# Patient Record
Sex: MALE | Race: WHITE | NOT HISPANIC OR LATINO | Employment: FULL TIME | URBAN - METROPOLITAN AREA
[De-identification: names, ages, dates, MRNs, and addresses within clinical notes are randomized per-mention and may not be internally consistent; named-entity substitution may affect disease eponyms.]

---
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
## 2025-04-08 ENCOUNTER — OFFICE VISIT (OUTPATIENT)
Dept: FAMILY MEDICINE | Facility: CLINIC | Age: 20
End: 2025-04-08
Payer: COMMERCIAL

## 2025-04-08 VITALS
RESPIRATION RATE: 17 BRPM | HEART RATE: 69 BPM | HEIGHT: 68 IN | BODY MASS INDEX: 18.04 KG/M2 | WEIGHT: 119 LBS | TEMPERATURE: 98.4 F | DIASTOLIC BLOOD PRESSURE: 66 MMHG | OXYGEN SATURATION: 99 % | SYSTOLIC BLOOD PRESSURE: 127 MMHG

## 2025-04-08 DIAGNOSIS — N50.811 PAIN IN BOTH TESTICLES: ICD-10-CM

## 2025-04-08 DIAGNOSIS — R10.84 ABDOMINAL PAIN, GENERALIZED: Primary | ICD-10-CM

## 2025-04-08 DIAGNOSIS — N50.812 PAIN IN BOTH TESTICLES: ICD-10-CM

## 2025-04-08 LAB
ALBUMIN SERPL BCG-MCNC: 5.2 G/DL (ref 3.5–5.2)
ALBUMIN UR-MCNC: NEGATIVE MG/DL
ALP SERPL-CCNC: 96 U/L (ref 40–150)
ALT SERPL W P-5'-P-CCNC: 11 U/L (ref 0–70)
ANION GAP SERPL CALCULATED.3IONS-SCNC: 12 MMOL/L (ref 7–15)
APPEARANCE UR: CLEAR
AST SERPL W P-5'-P-CCNC: 21 U/L (ref 0–45)
BACTERIA #/AREA URNS HPF: ABNORMAL /HPF
BILIRUB SERPL-MCNC: 0.7 MG/DL
BILIRUB UR QL STRIP: NEGATIVE
BUN SERPL-MCNC: 10.5 MG/DL (ref 6–20)
CALCIUM SERPL-MCNC: 9.8 MG/DL (ref 8.8–10.4)
CHLORIDE SERPL-SCNC: 105 MMOL/L (ref 98–107)
COLOR UR AUTO: YELLOW
CREAT SERPL-MCNC: 0.83 MG/DL (ref 0.67–1.17)
CRP SERPL-MCNC: <3 MG/L
EGFRCR SERPLBLD CKD-EPI 2021: >90 ML/MIN/1.73M2
ERYTHROCYTE [DISTWIDTH] IN BLOOD BY AUTOMATED COUNT: 11.4 % (ref 10–15)
GLUCOSE SERPL-MCNC: 92 MG/DL (ref 70–99)
GLUCOSE UR STRIP-MCNC: NEGATIVE MG/DL
HCO3 SERPL-SCNC: 25 MMOL/L (ref 22–29)
HCT VFR BLD AUTO: 45.9 % (ref 40–53)
HGB BLD-MCNC: 16.2 G/DL (ref 13.3–17.7)
HGB UR QL STRIP: ABNORMAL
HIV 1+2 AB+HIV1 P24 AG SERPL QL IA: NONREACTIVE
KETONES UR STRIP-MCNC: NEGATIVE MG/DL
LEUKOCYTE ESTERASE UR QL STRIP: NEGATIVE
MCH RBC QN AUTO: 32.7 PG (ref 26.5–33)
MCHC RBC AUTO-ENTMCNC: 35.3 G/DL (ref 31.5–36.5)
MCV RBC AUTO: 93 FL (ref 78–100)
NITRATE UR QL: NEGATIVE
PH UR STRIP: 7.5 [PH] (ref 5–7)
PLATELET # BLD AUTO: 247 10E3/UL (ref 150–450)
POTASSIUM SERPL-SCNC: 4.3 MMOL/L (ref 3.4–5.3)
PROT SERPL-MCNC: 7.3 G/DL (ref 6.4–8.3)
RBC # BLD AUTO: 4.95 10E6/UL (ref 4.4–5.9)
RBC #/AREA URNS AUTO: ABNORMAL /HPF
SODIUM SERPL-SCNC: 142 MMOL/L (ref 135–145)
SP GR UR STRIP: 1.01 (ref 1–1.03)
SQUAMOUS #/AREA URNS AUTO: ABNORMAL /LPF
UROBILINOGEN UR STRIP-ACNC: 0.2 E.U./DL
WBC # BLD AUTO: 6.4 10E3/UL (ref 4–11)
WBC #/AREA URNS AUTO: ABNORMAL /HPF

## 2025-04-08 PROCEDURE — 3078F DIAST BP <80 MM HG: CPT

## 2025-04-08 PROCEDURE — 87389 HIV-1 AG W/HIV-1&-2 AB AG IA: CPT

## 2025-04-08 PROCEDURE — 99214 OFFICE O/P EST MOD 30 MIN: CPT

## 2025-04-08 PROCEDURE — 81001 URINALYSIS AUTO W/SCOPE: CPT

## 2025-04-08 PROCEDURE — 3074F SYST BP LT 130 MM HG: CPT

## 2025-04-08 PROCEDURE — 80053 COMPREHEN METABOLIC PANEL: CPT

## 2025-04-08 PROCEDURE — 86780 TREPONEMA PALLIDUM: CPT

## 2025-04-08 PROCEDURE — 86140 C-REACTIVE PROTEIN: CPT

## 2025-04-08 PROCEDURE — 36415 COLL VENOUS BLD VENIPUNCTURE: CPT

## 2025-04-08 PROCEDURE — 85027 COMPLETE CBC AUTOMATED: CPT

## 2025-04-08 ASSESSMENT — PATIENT HEALTH QUESTIONNAIRE - PHQ9
10. IF YOU CHECKED OFF ANY PROBLEMS, HOW DIFFICULT HAVE THESE PROBLEMS MADE IT FOR YOU TO DO YOUR WORK, TAKE CARE OF THINGS AT HOME, OR GET ALONG WITH OTHER PEOPLE: SOMEWHAT DIFFICULT
SUM OF ALL RESPONSES TO PHQ QUESTIONS 1-9: 7
SUM OF ALL RESPONSES TO PHQ QUESTIONS 1-9: 7
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
## 2025-04-09 LAB — T PALLIDUM AB SER QL: NONREACTIVE

## 2025-04-09 PROCEDURE — 87591 N.GONORRHOEAE DNA AMP PROB: CPT

## 2025-04-09 PROCEDURE — 87491 CHLMYD TRACH DNA AMP PROBE: CPT

## 2025-04-10 LAB
C TRACH DNA SPEC QL PROBE+SIG AMP: NEGATIVE
N GONORRHOEA DNA SPEC QL NAA+PROBE: NEGATIVE
SPECIMEN TYPE: NORMAL

## 2025-06-10 NOTE — PROGRESS NOTES
Patient was very agitated and upset for induction of anesthesia as well as post procedure  Discussed with patient and mom  Would recommend IV placement pre-procedure with parents present and IV versed and IV induction of anesthesia  Parent and child concerns addressed and questions answered  Subjective     REFERRING PROVIDER  YUE Pearce    REASON FOR VISIT  Testcular pain     HISTORY OF PRESENT ILLNESS  Mr. Munoz is a pleasant 20 year old male with a past medical history significant for depression, anxiety, and history of suicidal ideation who presents today for bilateral testicular pain.  I personally reviewed the family practice visit note from 4/8/2025 in preparation for today's visit.    It appears that Mateo has been experiencing this testicular pain for quite a while, but given the introduction of abdominal and flank pain as well was run through a series of tests which luckily did not show any concerning etiologies for his pain.  It appears there was also an order for an ultrasound of his testicles place but this was not completed yet.    Today:  Present for about 2 months  Gradual increase, but has stabilized over the last couple weeks  Advil seems to help with the pain as well as masturbation   Notes some swelling in the testicles   Initially didn't think he needed the ultrasound, but now it is worse than it was previously   No changes to urination   Some urinary frequency intermittently, but mainly affected by his fluid intake   Mild post-void dribbling   Anxiety and depression has been more under control over the last 6 months   Works overnights at [employer]   No new sexual partners recently   No erectile or ejaculatory pain   Intermittent twitching/shooting discomfort in the perineum over the last couple years     REVIEW OF SYSTEMS  Review of Systems   Constitutional:  Negative for fatigue and unexpected weight change.   HENT:  Negative for hearing loss.    Eyes:  Negative for visual disturbance.   Respiratory:  Negative for shortness of breath.    Cardiovascular:  Negative for chest pain.   Gastrointestinal:  Positive for abdominal pain (Triggered by certain foods or lack of food). Negative for constipation.   Genitourinary:  Negative for hematuria.   Musculoskeletal:  Positive for  back pain (A bit of lower and upper back pain).   Neurological:  Negative for dizziness and light-headedness.   Hematological:  Negative for adenopathy.   Psychiatric/Behavioral:  Negative for sleep disturbance.      Social History:  Former smoker, last was 2 years ago, uses nicotine pouches and smokes weed    Family History:  Denies any known family history of urologic malignancy     Really doesn't know much about dads side, and mom was adopted     Objective     PHYSICAL EXAM  Physical Exam  Constitutional:       Appearance: Normal appearance.   HENT:      Head: Normocephalic and atraumatic.      Nose: No congestion.      Mouth/Throat:      Mouth: Mucous membranes are moist.   Eyes:      General:         Right eye: No discharge.         Left eye: No discharge.   Pulmonary:      Effort: No respiratory distress.   Abdominal:      General: There is no distension.   Musculoskeletal:         General: No deformity.   Skin:     General: Skin is warm and dry.   Neurological:      Mental Status: He is alert and oriented to person, place, and time.   Psychiatric:         Mood and Affect: Mood normal.         Behavior: Behavior normal.       LABORATORY   Latest Reference Range & Units 04/08/25 16:09   Color Urine Colorless, Straw, Light Yellow, Yellow  Yellow   Appearance Urine Clear  Clear   Glucose Urine Negative mg/dL Negative   Bilirubin Urine Negative  Negative   Ketones Urine Negative mg/dL Negative   Specific Gravity Urine 1.003 - 1.035  1.015   pH Urine 5.0 - 7.0  7.5 (H)   Protein Albumin Urine Negative mg/dL Negative   Urobilinogen Urine 0.2, 1.0 E.U./dL 0.2   Nitrite Urine Negative  Negative   Blood Urine Negative  Trace !   Leukocyte Esterase Urine Negative  Negative   WBC Urine 0-5 /HPF /HPF 0-5   RBC Urine 0-2 /HPF /HPF 0-2   Bacteria Urine None Seen /HPF Few !   Squamous Epithelial /LPF Urine None Seen /LPF Few !   (H): Data is abnormally high  !: Data is abnormal    Assessment & Plan   Testcular pain    Anxiety  Depression    It was my pleasure to meet with Mr. Munoz in the office today in regards to his approximately 2-month history of testicular pain.  After reviewing his clinical history, I was first happy to let him know that as of now I do not have any reason to believe anything dangerous is going on.  That being said, it would be a very good idea for us to have him proceed with the testicular ultrasound ordered by his primary for better evaluation of the testicles.  Given that we will be getting this done first available, we deferred a testicular exam in the office today.    We then discussed what I believe is the most likely diagnosis leading to his pain, specifically pelvic floor dysfunction.  We discussed the strong relation of pelvic floor dysfunction with young individuals with diagnoses of anxiety and depression all of his symptoms can also be explained by this diagnosis.  We will of course follow-up on the results of the ultrasound there is anything to intervene upon, though ultimately I would like to get referred to pelvic or physical therapy now to expedite the process as I believe this will be very helpful for him in the future.    We did spend a little bit of time discussing ways that he can try to get his weight up as he is slightly concerned he is running low on the MRI.  I discussed with him the simple science diet from [employer] strength as well as recommendations for calorie tracking apps to eat at a caloric surplus as well as adding in resistance training with free weights or body weight exercises at least 2 days a week.    For now we will have him follow-up with me as needed recheck MyChart with questions. Mr. Munoz expressed understanding and agreement to the above discussion and plan and all of his questions were answered to his satisfaction.     PLAN  Complete first available testicular ultrasound  Referral to pelvic physical therapy    Signed by:    CASA Grewal  spent a total of 35 minutes spent on the date of the encounter doing chart review, history and exam, documentation, and further activities as noted above.

## 2025-06-10 NOTE — TELEPHONE ENCOUNTER
MEDICAL RECORDS REQUEST   [facility]  Urology Clinic  [address] [place] [address]  PHONE: [phone]  Fax: [phone]        FUTURE VISIT INFORMATION                                                   Beka Munoz, : 2005 scheduled for future visit at [facility]    APPOINTMENT INFORMATION:  Date: 2025  Provider:  Antonino Baez PA-C  Reason for Visit/Diagnosis: Pain in both testicles    REFERRAL INFORMATION:  Referring provider:  Zoë Deleon APRN CNP in [facility]/PEDS      RECORDS REQUESTED FOR VISIT                                                     NOTES  STATUS/DETAILS   OFFICE NOTE from referring provider  2025 -- Zoë Deleon APRN CNP in [facility]   OFFICE NOTE from other specialist  yes   MEDICATION LIST  yes   LABS     URINALYSIS (UA)  yes   IMAGES  NO     PRE-VISIT CHECKLIST      Joint diagnostic appointment coordinated correctly          (ensure right order & amount of time) Yes   RECORD COLLECTION COMPLETE Yes

## 2025-06-11 ENCOUNTER — OFFICE VISIT (OUTPATIENT)
Dept: UROLOGY | Facility: CLINIC | Age: 20
End: 2025-06-11
Payer: COMMERCIAL

## 2025-06-11 ENCOUNTER — PRE VISIT (OUTPATIENT)
Dept: UROLOGY | Facility: CLINIC | Age: 20
End: 2025-06-11

## 2025-06-11 VITALS
HEIGHT: 68 IN | DIASTOLIC BLOOD PRESSURE: 70 MMHG | OXYGEN SATURATION: 96 % | BODY MASS INDEX: 16.67 KG/M2 | HEART RATE: 110 BPM | SYSTOLIC BLOOD PRESSURE: 124 MMHG | WEIGHT: 110 LBS

## 2025-06-11 DIAGNOSIS — N50.812 PAIN IN BOTH TESTICLES: ICD-10-CM

## 2025-06-11 DIAGNOSIS — N50.811 PAIN IN BOTH TESTICLES: ICD-10-CM

## 2025-06-11 PROCEDURE — 1125F AMNT PAIN NOTED PAIN PRSNT: CPT | Performed by: STUDENT IN AN ORGANIZED HEALTH CARE EDUCATION/TRAINING PROGRAM

## 2025-06-11 PROCEDURE — 99244 OFF/OP CNSLTJ NEW/EST MOD 40: CPT | Performed by: STUDENT IN AN ORGANIZED HEALTH CARE EDUCATION/TRAINING PROGRAM

## 2025-06-11 PROCEDURE — 3078F DIAST BP <80 MM HG: CPT | Performed by: STUDENT IN AN ORGANIZED HEALTH CARE EDUCATION/TRAINING PROGRAM

## 2025-06-11 PROCEDURE — 3074F SYST BP LT 130 MM HG: CPT | Performed by: STUDENT IN AN ORGANIZED HEALTH CARE EDUCATION/TRAINING PROGRAM

## 2025-06-11 ASSESSMENT — ENCOUNTER SYMPTOMS
DIZZINESS: 0
ADENOPATHY: 0
ABDOMINAL PAIN: 1
SLEEP DISTURBANCE: 0
FATIGUE: 0
SHORTNESS OF BREATH: 0
LIGHT-HEADEDNESS: 0
CONSTIPATION: 0
BACK PAIN: 1
HEMATURIA: 0
UNEXPECTED WEIGHT CHANGE: 0

## 2025-06-11 ASSESSMENT — PAIN SCALES - GENERAL: PAINLEVEL_OUTOF10: MODERATE PAIN (5)

## 2025-06-11 NOTE — NURSING NOTE
"Chief Complaint   Patient presents with    Consult     Pt states here for testicle pain         Onset was about 2 months ago.  Pt states that the epididymis is what hurts, sometimes it will swell and then go down. Mostly the left side, sometimes an ache on the right. Some radiation to the lower left abdomen. Sometimes it can get worse than a 5, mostly when he wakes or goes to sleep, and when most of the pain is. Sleeping and moving it makes it worse, masturbation makes it a little better, described the pain turning into a ache. Pain comes and goes, but can still feel it there 24/7, but not pain all the time, describes the feeling as the testicle coming up, or like a pressure    Blood pressure 124/70, pulse 110, height 1.727 m (5' 8\"), weight 49.9 kg (110 lb), SpO2 96%. Body mass index is 16.73 kg/m .    Patient Active Problem List   Diagnosis    Suicidal ideation    Major depressive disorder, recurrent episode, moderate (H)    KALPANA (generalized anxiety disorder)    Known health problems: none    Severe episode of recurrent major depressive disorder, without psychotic features (H)       No Known Allergies    Current Outpatient Medications   Medication Sig Dispense Refill    buPROPion (WELLBUTRIN XL) 150 MG 24 hr tablet Take 1 tablet (150 mg) by mouth every morning. (Patient not taking: Reported on 6/11/2025) 30 tablet 0    hydrOXYzine (ATARAX) 10 MG tablet Take 1-2 tablets (10-20 mg) by mouth 2 times daily as needed for anxiety (or sleep) (Patient not taking: Reported on 6/11/2025) 60 tablet 0    sertraline (ZOLOFT) 100 MG tablet Take 50 mg by mouth daily. (Patient not taking: Reported on 6/11/2025)      traZODone (DESYREL) 50 MG tablet Take 50 mg by mouth as needed for sleep. (Patient not taking: Reported on 6/11/2025)         Social History     Tobacco Use    Smoking status: Every Day     Types: Cigarettes   Vaping Use    Vaping status: Some Days    Substances: THC   Substance Use Topics    Alcohol use: Not " Currently    Drug use: Yes     Types: Marijuana       Akhil Cook  6/11/2025  8:45 AM

## 2025-06-11 NOTE — LETTER
6/11/2025       RE: Beka Munoz  [address]  [facility] [address]     Dear Colleague,    Thank you for referring your patient, Beka Munoz, to the [facility] [place] at [facility]. Please see a copy of my visit note below.    Subjective    REFERRING PROVIDER  YUE Pearce    REASON FOR VISIT  Testcular pain     HISTORY OF PRESENT ILLNESS  Mr. Munoz is a pleasant 20 year old male with a past medical history significant for depression, anxiety, and history of suicidal ideation who presents today for bilateral testicular pain.  I personally reviewed the family practice visit note from 4/8/2025 in preparation for today's visit.    It appears that Mateo has been experiencing this testicular pain for quite a while, but given the introduction of abdominal and flank pain as well was run through a series of tests which luckily did not show any concerning etiologies for his pain.  It appears there was also an order for an ultrasound of his testicles place but this was not completed yet.    Today:  Present for about 2 months  Gradual increase, but has stabilized over the last couple weeks  Advil seems to help with the pain as well as masturbation   Notes some swelling in the testicles   Initially didn't think he needed the ultrasound, but now it is worse than it was previously   No changes to urination   Some urinary frequency intermittently, but mainly affected by his fluid intake   Mild post-void dribbling   Anxiety and depression has been more under control over the last 6 months   Works overnights at [employer]   No new sexual partners recently   No erectile or ejaculatory pain   Intermittent twitching/shooting discomfort in the perineum over the last couple years     REVIEW OF SYSTEMS  Review of Systems   Constitutional:  Negative for fatigue and unexpected weight change.   HENT:  Negative for hearing loss.    Eyes:   Negative for visual disturbance.   Respiratory:  Negative for shortness of breath.    Cardiovascular:  Negative for chest pain.   Gastrointestinal:  Positive for abdominal pain (Triggered by certain foods or lack of food). Negative for constipation.   Genitourinary:  Negative for hematuria.   Musculoskeletal:  Positive for back pain (A bit of lower and upper back pain).   Neurological:  Negative for dizziness and light-headedness.   Hematological:  Negative for adenopathy.   Psychiatric/Behavioral:  Negative for sleep disturbance.      Social History:  Former smoker, last was 2 years ago, uses nicotine pouches and smokes weed    Family History:  Denies any known family history of urologic malignancy     Really doesn't know much about dads side, and mom was adopted     Objective    PHYSICAL EXAM  Physical Exam  Constitutional:       Appearance: Normal appearance.   HENT:      Head: Normocephalic and atraumatic.      Nose: No congestion.      Mouth/Throat:      Mouth: Mucous membranes are moist.   Eyes:      General:         Right eye: No discharge.         Left eye: No discharge.   Pulmonary:      Effort: No respiratory distress.   Abdominal:      General: There is no distension.   Musculoskeletal:         General: No deformity.   Skin:     General: Skin is warm and dry.   Neurological:      Mental Status: He is alert and oriented to person, place, and time.   Psychiatric:         Mood and Affect: Mood normal.         Behavior: Behavior normal.       LABORATORY   Latest Reference Range & Units 04/08/25 16:09   Color Urine Colorless, Straw, Light Yellow, Yellow  Yellow   Appearance Urine Clear  Clear   Glucose Urine Negative mg/dL Negative   Bilirubin Urine Negative  Negative   Ketones Urine Negative mg/dL Negative   Specific Gravity Urine 1.003 - 1.035  1.015   pH Urine 5.0 - 7.0  7.5 (H)   Protein Albumin Urine Negative mg/dL Negative   Urobilinogen Urine 0.2, 1.0 E.U./dL 0.2   Nitrite Urine Negative  Negative    Blood Urine Negative  Trace !   Leukocyte Esterase Urine Negative  Negative   WBC Urine 0-5 /HPF /HPF 0-5   RBC Urine 0-2 /HPF /HPF 0-2   Bacteria Urine None Seen /HPF Few !   Squamous Epithelial /LPF Urine None Seen /LPF Few !   (H): Data is abnormally high  !: Data is abnormal    Assessment & Plan  Testcular pain   Anxiety  Depression    It was my pleasure to meet with Mr. Munoz in the office today in regards to his approximately 2-month history of testicular pain.  After reviewing his clinical history, I was first happy to let him know that as of now I do not have any reason to believe anything dangerous is going on.  That being said, it would be a very good idea for us to have him proceed with the testicular ultrasound ordered by his primary for better evaluation of the testicles.  Given that we will be getting this done first available, we deferred a testicular exam in the office today.    We then discussed what I believe is the most likely diagnosis leading to his pain, specifically pelvic floor dysfunction.  We discussed the strong relation of pelvic floor dysfunction with young individuals with diagnoses of anxiety and depression all of his symptoms can also be explained by this diagnosis.  We will of course follow-up on the results of the ultrasound there is anything to intervene upon, though ultimately I would like to get referred to pelvic or physical therapy now to expedite the process as I believe this will be very helpful for him in the future.    We did spend a little bit of time discussing ways that he can try to get his weight up as he is slightly concerned he is running low on the MRI.  I discussed with him the simple science diet from  strength as well as recommendations for calorie tracking apps to eat at a caloric surplus as well as adding in resistance training with free weights or body weight exercises at least 2 days a week.    For now we will have him follow-up with me as needed  recheck Julianne with questions. Mr. Munoz expressed understanding and agreement to the above discussion and plan and all of his questions were answered to his satisfaction.     PLAN  Complete first available testicular ultrasound  Referral to pelvic physical therapy    Signed by:    Antonino Baez PA-C    I spent a total of 35 minutes spent on the date of the encounter doing chart review, history and exam, documentation, and further activities as noted above.     Again, thank you for allowing me to participate in the care of your patient.      Sincerely,    Antonino Baez PA-C

## 2025-06-11 NOTE — PATIENT INSTRUCTIONS
Simple Science Diet - [employer]rength    MyFitnessPal or [employer].    Resistent training 2 days per week, 30-45 minutes

## 2025-07-04 NOTE — PHARMACY-ADMISSION MEDICATION HISTORY
Admission medication history interview status for this patient is complete. See [facility] admission navigator for allergy information, prior to admission medications and immunization status.     Medication history interview done, indicate source(s): Patient and Family  Medication history resources (including written lists, pill bottles, clinic record):None  Pharmacy:     Changes made to PTA medication list:  Added: All meds  Deleted:   Changed:     Actions taken by pharmacist (provider contacted, etc):sticky note for MD     Additional medication history information:None    Medication reconciliation/reorder completed by provider prior to medication history?  N   (Y/N)     For patients on insulin therapy: NA       Prior to Admission medications    Medication Sig Last Dose Taking? Auth Provider   Melatonin 10 MG TABS tablet Take 10 mg by mouth nightly as needed for sleep 7/5/2021 at Unknown time Yes Unknown, Entered By History   NONFORMULARY Take 1 capsule by mouth daily as needed Ashwaganda  Yes Unknown, Entered By History   Vitamin D, Cholecalciferol, 25 MCG (1000 UT) TABS Take 1,000 Units by mouth daily as needed Past Week at Unknown time Yes Unknown, Entered By History          Never

## 2025-07-15 ENCOUNTER — THERAPY VISIT (OUTPATIENT)
Dept: PHYSICAL THERAPY | Facility: CLINIC | Age: 20
End: 2025-07-15
Attending: STUDENT IN AN ORGANIZED HEALTH CARE EDUCATION/TRAINING PROGRAM
Payer: COMMERCIAL

## 2025-07-15 DIAGNOSIS — N50.811 PAIN IN BOTH TESTICLES: ICD-10-CM

## 2025-07-15 DIAGNOSIS — N50.812 PAIN IN BOTH TESTICLES: ICD-10-CM

## 2025-07-15 PROCEDURE — 97110 THERAPEUTIC EXERCISES: CPT | Mod: GP | Performed by: PHYSICAL THERAPIST

## 2025-07-15 PROCEDURE — 97161 PT EVAL LOW COMPLEX 20 MIN: CPT | Mod: GP | Performed by: PHYSICAL THERAPIST

## 2025-07-31 ENCOUNTER — THERAPY VISIT (OUTPATIENT)
Dept: PHYSICAL THERAPY | Facility: CLINIC | Age: 20
End: 2025-07-31
Attending: STUDENT IN AN ORGANIZED HEALTH CARE EDUCATION/TRAINING PROGRAM
Payer: COMMERCIAL

## 2025-07-31 DIAGNOSIS — N50.811 PAIN IN BOTH TESTICLES: Primary | ICD-10-CM

## 2025-07-31 DIAGNOSIS — N50.812 PAIN IN BOTH TESTICLES: Primary | ICD-10-CM

## 2025-08-07 ENCOUNTER — THERAPY VISIT (OUTPATIENT)
Dept: PHYSICAL THERAPY | Facility: CLINIC | Age: 20
End: 2025-08-07
Payer: COMMERCIAL

## 2025-08-07 DIAGNOSIS — N50.811 PAIN IN BOTH TESTICLES: Primary | ICD-10-CM

## 2025-08-07 DIAGNOSIS — N50.812 PAIN IN BOTH TESTICLES: Primary | ICD-10-CM
